# Patient Record
Sex: FEMALE | Race: OTHER | HISPANIC OR LATINO | URBAN - METROPOLITAN AREA
[De-identification: names, ages, dates, MRNs, and addresses within clinical notes are randomized per-mention and may not be internally consistent; named-entity substitution may affect disease eponyms.]

---

## 2017-01-08 ENCOUNTER — EMERGENCY (EMERGENCY)
Facility: HOSPITAL | Age: 37
LOS: 1 days | Discharge: HOME | End: 2017-01-08
Attending: EMERGENCY MEDICINE
Payer: COMMERCIAL

## 2017-01-08 VITALS
RESPIRATION RATE: 20 BRPM | SYSTOLIC BLOOD PRESSURE: 109 MMHG | DIASTOLIC BLOOD PRESSURE: 76 MMHG | HEART RATE: 70 BPM | OXYGEN SATURATION: 100 % | TEMPERATURE: 98 F

## 2017-01-08 VITALS — WEIGHT: 160.06 LBS

## 2017-01-08 LAB
APPEARANCE UR: CLEAR — SIGNIFICANT CHANGE UP
BACTERIA # UR AUTO: ABNORMAL
BILIRUB UR-MCNC: NEGATIVE — SIGNIFICANT CHANGE UP
COLOR SPEC: YELLOW — SIGNIFICANT CHANGE UP
DIFF PNL FLD: ABNORMAL
EPI CELLS # UR: SIGNIFICANT CHANGE UP
GLUCOSE UR QL: NEGATIVE MG/DL — SIGNIFICANT CHANGE UP
HCG UR QL: NEGATIVE — SIGNIFICANT CHANGE UP
KETONES UR-MCNC: NEGATIVE — SIGNIFICANT CHANGE UP
LEUKOCYTE ESTERASE UR-ACNC: NEGATIVE — SIGNIFICANT CHANGE UP
NITRITE UR-MCNC: NEGATIVE — SIGNIFICANT CHANGE UP
PH UR: 6.5 — SIGNIFICANT CHANGE UP (ref 4.8–8)
PROT UR-MCNC: NEGATIVE MG/DL — SIGNIFICANT CHANGE UP
RBC CASTS # UR COMP ASSIST: SIGNIFICANT CHANGE UP /HPF (ref 0–4)
SP GR SPEC: 1 — LOW (ref 1.01–1.02)
UROBILINOGEN FLD QL: NEGATIVE MG/DL — SIGNIFICANT CHANGE UP
WBC UR QL: SIGNIFICANT CHANGE UP

## 2017-01-08 PROCEDURE — 81001 URINALYSIS AUTO W/SCOPE: CPT

## 2017-01-08 PROCEDURE — 81025 URINE PREGNANCY TEST: CPT

## 2017-01-08 PROCEDURE — 99283 EMERGENCY DEPT VISIT LOW MDM: CPT

## 2017-01-08 RX ORDER — DOCUSATE SODIUM 100 MG
1 CAPSULE ORAL
Qty: 60 | Refills: 0 | OUTPATIENT
Start: 2017-01-08 | End: 2017-02-07

## 2017-01-08 RX ORDER — SENNA PLUS 8.6 MG/1
1 TABLET ORAL
Qty: 5 | Refills: 0 | OUTPATIENT
Start: 2017-01-08 | End: 2017-01-13

## 2017-01-08 NOTE — ED STATDOCS - MEDICAL DECISION MAKING DETAILS
No need for emergent abdominal imaging today. Discussed bowel regiment, check UA, prescribe colace, and refer to f/u with GI

## 2017-01-08 NOTE — ED STATDOCS - DETAILS:
I, Rosemary Mckeon, performed the initial face to face bedside interview with this patient regarding history of present illness, review of symptoms and relevant past medical, social and family history.  I completed an independent physical examination.  I was the initial provider who evaluated this patient. I have signed out the follow up of any pending tests (i.e. labs, radiological studies) to the ACP.  I have communicated the patient’s plan of care and disposition with the ACP.  The history, relevant review of systems, past medical and surgical history, medical decision making, and physical examination was documented by the scribe in my presence and I attest to the accuracy of the documentation.

## 2017-01-08 NOTE — ED STATDOCS - OBJECTIVE STATEMENT
35 y/o F presents with intermittent abdominal pressure x 3 months but worse x 3 days. Pt reports abnormal bowel movements including constipation and decreased stool production. She follows GI and PCP and had endoscopy for similar symptoms but was told endoscopy was nml. Pt states abdominal pain feels like gas pain. She denies nausea and vomiting. Pt denies tobacco use. No further complaints at this time. 35 y/o F presents with intermittent abdominal "gas-like" pressure x 3 months but worse x 3 days. Pt reports abnormal bowel movements including constipation and decreased stool production. She follows GI and PCP and had endoscopy for similar symptoms but was told endoscopy was nml. Pt states abdominal pain feels like gas pain. She denies nausea and vomiting. Pt denies tobacco use. No further complaints at this time.

## 2017-01-08 NOTE — ED STATDOCS - NS ED MD SCRIBE ATTENDING SCRIBE SECTIONS
REVIEW OF SYSTEMS/DISPOSITION/HIV/VITAL SIGNS( Pullset)/HISTORY OF PRESENT ILLNESS/PHYSICAL EXAM/PAST MEDICAL/SURGICAL/SOCIAL HISTORY

## 2017-05-25 ENCOUNTER — EMERGENCY (EMERGENCY)
Facility: HOSPITAL | Age: 37
LOS: 1 days | Discharge: HOME | End: 2017-05-25
Attending: EMERGENCY MEDICINE
Payer: COMMERCIAL

## 2017-05-25 VITALS
WEIGHT: 160.06 LBS | SYSTOLIC BLOOD PRESSURE: 117 MMHG | HEART RATE: 84 BPM | TEMPERATURE: 98 F | RESPIRATION RATE: 18 BRPM | DIASTOLIC BLOOD PRESSURE: 78 MMHG | OXYGEN SATURATION: 100 % | HEIGHT: 64 IN
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
## 2017-05-25 DIAGNOSIS — M79.652 PAIN IN LEFT THIGH: ICD-10-CM

## 2017-05-25 DIAGNOSIS — M54.32 SCIATICA, LEFT SIDE: ICD-10-CM

## 2017-05-25 PROCEDURE — 99283 EMERGENCY DEPT VISIT LOW MDM: CPT

## 2017-05-25 RX ORDER — IBUPROFEN 200 MG
600 TABLET ORAL ONCE
Qty: 0 | Refills: 0 | Status: COMPLETED | OUTPATIENT
Start: 2017-05-25 | End: 2017-05-25

## 2017-05-25 RX ADMIN — Medication 600 MILLIGRAM(S): at 21:47

## 2017-05-25 NOTE — ED STATDOCS - MEDICAL DECISION MAKING DETAILS
Given pt's history, occupation, and symptoms, discussed possibility of sciatica. Will advise treatment with NSAIDs and f/u with PMD for outpatient management.

## 2017-05-25 NOTE — ED ADULT TRIAGE NOTE - CHIEF COMPLAINT QUOTE
pt c/o left posterior upper thigh pain radiating down to great toe causing numbness. denies injury. resting/sitting makes pain worse.

## 2017-05-25 NOTE — ED STATDOCS - OBJECTIVE STATEMENT
37 y/o female presents to ED c/o non radiating pain/pressure to left posterior thigh x 2 days. She states that her pain is exacerbated with sitting, and pt sits frequently at work ( x 6 hours/day). She also notes numbness to left anterior shin and left great toe. Pt sought ED evaluation this evening because she was concerned about DVT. Denies lower back pain, calf pain or swelling. Denies recent long car rides/flights. No further complaints at this time.

## 2017-05-25 NOTE — ED STATDOCS - NS ED MD SCRIBE ATTENDING SCRIBE SECTIONS
PAST MEDICAL/SURGICAL/SOCIAL HISTORY/VITAL SIGNS( Pullset)/REVIEW OF SYSTEMS/HISTORY OF PRESENT ILLNESS/HIV/DISPOSITION/PHYSICAL EXAM

## 2019-08-05 ENCOUNTER — ASOB RESULT (OUTPATIENT)
Age: 39
End: 2019-08-05

## 2019-08-05 ENCOUNTER — APPOINTMENT (OUTPATIENT)
Dept: ANTEPARTUM | Facility: CLINIC | Age: 39
End: 2019-08-05
Payer: COMMERCIAL

## 2019-08-05 PROCEDURE — 76817 TRANSVAGINAL US OBSTETRIC: CPT

## 2019-08-16 ENCOUNTER — EMERGENCY (EMERGENCY)
Facility: HOSPITAL | Age: 39
LOS: 1 days | Discharge: HOME | End: 2019-08-16
Attending: EMERGENCY MEDICINE
Payer: COMMERCIAL

## 2019-08-16 VITALS
HEIGHT: 64 IN | TEMPERATURE: 99 F | WEIGHT: 164.91 LBS | DIASTOLIC BLOOD PRESSURE: 77 MMHG | SYSTOLIC BLOOD PRESSURE: 161 MMHG | OXYGEN SATURATION: 100 % | HEART RATE: 63 BPM | RESPIRATION RATE: 18 BRPM

## 2019-08-16 PROCEDURE — 99284 EMERGENCY DEPT VISIT MOD MDM: CPT

## 2019-08-16 PROCEDURE — 76817 TRANSVAGINAL US OBSTETRIC: CPT | Mod: 26

## 2019-08-16 NOTE — ED ADULT TRIAGE NOTE - CADM TRG TX PRIOR TO ARRIVAL
Pre-Op call completed. Medications list reviewed and updated as needed.  Instructed patient to hold all medications am of surgery.      Patient instructed to arrive for surgery at 0700 on 7/30/19 at [facility].    Pre-op instructions reviewed, verbalized understanding.  Questions answered.  Call back number of [phone] given in case other concerns or questions arise.    Patient instructed to bring in current list of medications (name of medication, dose and frequency of daily use) day of surgery.     Patient instructed to call Pre Admission Testing Nurses if patient develops any fever/ chills or symptoms of general illness prior to surgery.       Pre-op Teaching Completed:    OR - Procedure, OR - Time and time of arrival, Location of Registration, NPO, Medications to take Day of Surgery, Skin Prep, Equipment to bring day of surgery and Discharge Policy: Ride/Responsibile Adult    
none

## 2019-08-16 NOTE — ED PROVIDER NOTE - OBJECTIVE STATEMENT
38 yo female  Lmp may 25th   OBGYN Deepali curiel   last sono was 2 weeks ago with sac but no baby or FHR noted. was given option of waiting or having D=C scheduled and elected to wait.   today lower abdominal cramping and brownish yellow discharge slight  no cp.sob. n,v,d/   no allergies or pmhx

## 2019-08-16 NOTE — ED PROVIDER NOTE - PROGRESS NOTE DETAILS
DR KIM saw patient in ER   states that she is to be discharged and will fu in the office within the next couple days   as per OBGYN not ectopic rupture vs are stable and pt advised on importance of fu

## 2019-08-16 NOTE — ED PROVIDER NOTE - ATTENDING CONTRIBUTION TO CARE
I performed a history and physical exam of the patient and discussed their management with the advanced care provider. I reviewed the advanced care provider's note and agree with the documented findings and plan of care. My medical decision making and objective findings are found above.     40yo female  at 12 weeks gestation presenting with vaginal bleeding and cramping. patient had US with ob/gyn which did not demonstrate IUP. At that time patient offered D&C vs expectant management. Today, patient states that she had worsening bleeding and pain which prompted her to come to ED. PE exam remarkable for minimal TTP over suprapubic region. Will obtain labs, TVUS, d/w ob/gyn, and reassess. Likely threatened ab vs ectopic will continue to monitor. Padmini Eduardo DO.

## 2019-08-16 NOTE — CONSULT NOTE ADULT - SUBJECTIVE AND OBJECTIVE BOX
VERONICA REYES is a 40yo  with LMP 19 with previously seen intrauterine sac in the office. She was previously offered a D&C vs. expectant management and opted for expectant management.  She starting having lower abdominal cramping this morning and having a small amount of bleeding. She denies any heavy bleeding. She reports that she has had some intermittent, sharp LLQ  abdominal pain. + constipation,  but patient states that she chronically has issues with chronic constipation.     ROS:  Gen: no fatigue  CV: no chest pain  Resp: no SOB, wheezing  Neuro: no vision change, headache  GI: + lower abdominal pain, diarrhea, + constipation  : no dysuria, increased frequency, urge  Gyn: + vaginal bleeding, abnormal discharge  ID: no fevers, chills  Int: no rash  MSK: no weakness      ObHx:   -    -    GynHx:     - periods every 28 days  - no h/o abnormal Paps  - denies any STIs  - sexually active, trying to get pregnant     SocHx:      No Known Allergies        Meds:  Home Medications:    Health Maintenance:       Height (cm): 162.56 (19 @ 18:22)  Weight (kg): 74.8 (19 @ 18:22)  BMI (kg/m2): 28.3 (19 @ 18:22)  BSA (m2): 1.8 (19 @ 18:22)  T(C): 37 (19 @ 18:22), Max: 37 (19 @ 18:22)  HR: 63 (19 @ 18:22) (63 - 63)  BP: 161/77 (19 @ 18:22) (161/77 - 161/77)  RR: 18 (19 @ 18:22) (18 - 18)  SpO2: 100% (19 @ 18:22) (100% - 100%)    Physical Exam:  Gen: alert oriented no acute distress  Abd: soft, + LLQ tenderness, no surgical incisions,+ BS  Gyn:    external genitalia normal in appearance no lesions    on speculum exam no abnormal lesions visualized, scant blood in the vagina, normal appear parous cervix    on bimanual exam small anteverted uterus no adnexal masses palpated, + left adnexal tenderness.  No CMT.         < from: US Transvaginal, OB (19 @ 21:42) >  terus: 11.1 x 5.8 x 7.7 cm. Subchorionic hematoma measuring 2.1 x 0.9 x   1.0 cm.    Gestational Sac Size (mean): Irregular,2.5 cm, compatible with estimated   gestational age of 7 weeks 2 days. No fetal pole identified. No yolk sac   identified.    Right ovary: 4.8 x 2.5 x 2.1 cm. 2.0 cm corpus luteum.    Left ovary: 2.5 x 1.8 x 1.3 cm.Prominent tubular structure with mobile   debris within the left adnexa.    Fluid: None.    IMPRESSION:    Irregular 25 mm saclike structure within the uterus without fetal pole or   yolk sac. Prominent tubular structure with mobile debris noted within the   left adnexa, suspicious forhematosalpinx. Constellation of findings are   worrisome for left adnexal ectopic ruptured into the left fallopian tube   with possible pseudosac in the uterus. Failed pregnancy with incidental   pyosalpinx is possible but considered less likely. Comparison with   patient's outside most recent imaging would be helpful. Findings were   discussed with Dr. Dupont at 2019.      < end of copied text >      Case discussed with Dr. Phoenix Pending beta hcG and CBC VERONICA REYES is a 38yo  with LMP 19 with previously seen intrauterine sac in the office. She was previously offered a D&C vs. expectant management and opted for expectant management.  She starting having lower abdominal cramping this morning and having a small amount of bleeding. She denies any heavy bleeding. She reports that she has had some intermittent, sharp LLQ  abdominal pain. + constipation,  but patient states that she chronically has issues with chronic constipation.     ROS:  Gen: no fatigue  CV: no chest pain  Resp: no SOB, wheezing  Neuro: no vision change, headache  GI: + lower abdominal pain, diarrhea, + constipation  : no dysuria, increased frequency, urge  Gyn: + vaginal bleeding, abnormal discharge  ID: no fevers, chills  Int: no rash  MSK: no weakness      ObHx:   -    -    GynHx:     - periods every 28 days  - no h/o abnormal Paps  - denies any STIs  - sexually active, trying to get pregnant     SocHx:      No Known Allergies        Meds:  Home Medications:    Health Maintenance:       Height (cm): 162.56 (19 @ 18:22)  Weight (kg): 74.8 (19 @ 18:22)  BMI (kg/m2): 28.3 (19 @ 18:22)  BSA (m2): 1.8 (19 @ 18:22)  T(C): 37 (19 @ 18:22), Max: 37 (19 @ 18:22)  HR: 63 (19 @ 18:22) (63 - 63)  BP: 161/77 (19 @ 18:22) (161/77 - 161/77)  RR: 18 (19 @ 18:22) (18 - 18)  SpO2: 100% (19 @ 18:22) (100% - 100%)    Physical Exam:  Gen: alert oriented no acute distress  Abd: soft, + LLQ tenderness, no surgical incisions,+ BS  Gyn:    external genitalia normal in appearance no lesions    on speculum exam no abnormal lesions visualized, scant blood in the vagina, normal appear parous cervix    on bimanual exam small anteverted uterus no adnexal masses palpated, + left adnexal tenderness.  No CMT.         < from: US Transvaginal, OB (19 @ 21:42) >  terus: 11.1 x 5.8 x 7.7 cm. Subchorionic hematoma measuring 2.1 x 0.9 x   1.0 cm.    Gestational Sac Size (mean): Irregular,2.5 cm, compatible with estimated   gestational age of 7 weeks 2 days. No fetal pole identified. No yolk sac   identified.    Right ovary: 4.8 x 2.5 x 2.1 cm. 2.0 cm corpus luteum.    Left ovary: 2.5 x 1.8 x 1.3 cm.Prominent tubular structure with mobile   debris within the left adnexa.    Fluid: None.    IMPRESSION:    Irregular 25 mm saclike structure within the uterus without fetal pole or   yolk sac. Prominent tubular structure with mobile debris noted within the   left adnexa, suspicious forhematosalpinx. Constellation of findings are   worrisome for left adnexal ectopic ruptured into the left fallopian tube   with possible pseudosac in the uterus. Failed pregnancy with incidental   pyosalpinx is possible but considered less likely. Comparison with   patient's outside most recent imaging would be helpful. Findings were   discussed with Dr. Dupont at 2019.      < end of copied text >

## 2019-08-16 NOTE — ED STATDOCS - ATTENDING CONTRIBUTION TO CARE
I performed a history and physical exam of the patient and discussed their management with the advanced care provider. I reviewed the advanced care provider's note and agree with the documented findings and plan of care. My medical decision making and objective findings are found above.     38yo female  at 12 weeks gestation presenting with vaginal bleeding and cramping. patient had US with ob/gyn which did not demonstrate IUP. At that time patient offered D&C vs expectant management. Today, patient states that she had worsening bleeding and pain which prompted her to come to ED. PE exam remarkable for minimal TTP over suprapubic region. Will obtain labs, TVUS, d/w ob/gyn, and reassess. Likely threatened ab vs ectopic will continue to monitor. Padmini Eduardo DO

## 2019-08-17 LAB
ALBUMIN SERPL ELPH-MCNC: 4.5 G/DL — SIGNIFICANT CHANGE UP (ref 3.3–5.2)
ALP SERPL-CCNC: 52 U/L — SIGNIFICANT CHANGE UP (ref 40–120)
ALT FLD-CCNC: 13 U/L — SIGNIFICANT CHANGE UP
ANION GAP SERPL CALC-SCNC: 11 MMOL/L — SIGNIFICANT CHANGE UP (ref 5–17)
APPEARANCE UR: CLEAR — SIGNIFICANT CHANGE UP
APTT BLD: 29.4 SEC — SIGNIFICANT CHANGE UP (ref 27.5–36.3)
AST SERPL-CCNC: 22 U/L — SIGNIFICANT CHANGE UP
BASOPHILS # BLD AUTO: 0.02 K/UL — SIGNIFICANT CHANGE UP (ref 0–0.2)
BASOPHILS NFR BLD AUTO: 0.4 % — SIGNIFICANT CHANGE UP (ref 0–2)
BILIRUB SERPL-MCNC: 0.3 MG/DL — LOW (ref 0.4–2)
BILIRUB UR-MCNC: NEGATIVE — SIGNIFICANT CHANGE UP
BLD GP AB SCN SERPL QL: SIGNIFICANT CHANGE UP
BUN SERPL-MCNC: 7 MG/DL — LOW (ref 8–20)
CALCIUM SERPL-MCNC: 9.9 MG/DL — SIGNIFICANT CHANGE UP (ref 8.6–10.2)
CHLORIDE SERPL-SCNC: 101 MMOL/L — SIGNIFICANT CHANGE UP (ref 98–107)
CO2 SERPL-SCNC: 24 MMOL/L — SIGNIFICANT CHANGE UP (ref 22–29)
COLOR SPEC: YELLOW — SIGNIFICANT CHANGE UP
CREAT SERPL-MCNC: 0.45 MG/DL — LOW (ref 0.5–1.3)
DIFF PNL FLD: NEGATIVE — SIGNIFICANT CHANGE UP
EOSINOPHIL # BLD AUTO: 0.07 K/UL — SIGNIFICANT CHANGE UP (ref 0–0.5)
EOSINOPHIL NFR BLD AUTO: 1.3 % — SIGNIFICANT CHANGE UP (ref 0–6)
GLUCOSE SERPL-MCNC: 96 MG/DL — SIGNIFICANT CHANGE UP (ref 70–115)
GLUCOSE UR QL: NEGATIVE MG/DL — SIGNIFICANT CHANGE UP
HCG SERPL-ACNC: HIGH MIU/ML
HCG UR QL: POSITIVE
HCT VFR BLD CALC: 35.9 % — SIGNIFICANT CHANGE UP (ref 34.5–45)
HGB BLD-MCNC: 11.9 G/DL — SIGNIFICANT CHANGE UP (ref 11.5–15.5)
IMM GRANULOCYTES NFR BLD AUTO: 0.4 % — SIGNIFICANT CHANGE UP (ref 0–1.5)
INR BLD: 1.07 RATIO — SIGNIFICANT CHANGE UP (ref 0.88–1.16)
KETONES UR-MCNC: NEGATIVE — SIGNIFICANT CHANGE UP
LEUKOCYTE ESTERASE UR-ACNC: NEGATIVE — SIGNIFICANT CHANGE UP
LYMPHOCYTES # BLD AUTO: 1.73 K/UL — SIGNIFICANT CHANGE UP (ref 1–3.3)
LYMPHOCYTES # BLD AUTO: 32.2 % — SIGNIFICANT CHANGE UP (ref 13–44)
MCHC RBC-ENTMCNC: 30.7 PG — SIGNIFICANT CHANGE UP (ref 27–34)
MCHC RBC-ENTMCNC: 33.1 GM/DL — SIGNIFICANT CHANGE UP (ref 32–36)
MCV RBC AUTO: 92.5 FL — SIGNIFICANT CHANGE UP (ref 80–100)
MONOCYTES # BLD AUTO: 0.53 K/UL — SIGNIFICANT CHANGE UP (ref 0–0.9)
MONOCYTES NFR BLD AUTO: 9.9 % — SIGNIFICANT CHANGE UP (ref 2–14)
NEUTROPHILS # BLD AUTO: 3 K/UL — SIGNIFICANT CHANGE UP (ref 1.8–7.4)
NEUTROPHILS NFR BLD AUTO: 55.8 % — SIGNIFICANT CHANGE UP (ref 43–77)
NITRITE UR-MCNC: NEGATIVE — SIGNIFICANT CHANGE UP
PH UR: 6.5 — SIGNIFICANT CHANGE UP (ref 5–8)
PLATELET # BLD AUTO: 278 K/UL — SIGNIFICANT CHANGE UP (ref 150–400)
POTASSIUM SERPL-MCNC: 3.8 MMOL/L — SIGNIFICANT CHANGE UP (ref 3.5–5.3)
POTASSIUM SERPL-SCNC: 3.8 MMOL/L — SIGNIFICANT CHANGE UP (ref 3.5–5.3)
PROT SERPL-MCNC: 7.6 G/DL — SIGNIFICANT CHANGE UP (ref 6.6–8.7)
PROT UR-MCNC: NEGATIVE MG/DL — SIGNIFICANT CHANGE UP
PROTHROM AB SERPL-ACNC: 12.3 SEC — SIGNIFICANT CHANGE UP (ref 10–12.9)
RBC # BLD: 3.88 M/UL — SIGNIFICANT CHANGE UP (ref 3.8–5.2)
RBC # FLD: 12.9 % — SIGNIFICANT CHANGE UP (ref 10.3–14.5)
SODIUM SERPL-SCNC: 136 MMOL/L — SIGNIFICANT CHANGE UP (ref 135–145)
SP GR SPEC: 1 — LOW (ref 1.01–1.02)
UROBILINOGEN FLD QL: NEGATIVE MG/DL — SIGNIFICANT CHANGE UP
WBC # BLD: 5.37 K/UL — SIGNIFICANT CHANGE UP (ref 3.8–10.5)
WBC # FLD AUTO: 5.37 K/UL — SIGNIFICANT CHANGE UP (ref 3.8–10.5)

## 2019-08-17 PROCEDURE — 99284 EMERGENCY DEPT VISIT MOD MDM: CPT | Mod: 25

## 2019-08-17 PROCEDURE — 86850 RBC ANTIBODY SCREEN: CPT

## 2019-08-17 PROCEDURE — T1013: CPT

## 2019-08-17 PROCEDURE — 87186 SC STD MICRODIL/AGAR DIL: CPT

## 2019-08-17 PROCEDURE — 81025 URINE PREGNANCY TEST: CPT

## 2019-08-17 PROCEDURE — 76817 TRANSVAGINAL US OBSTETRIC: CPT

## 2019-08-17 PROCEDURE — 86900 BLOOD TYPING SEROLOGIC ABO: CPT

## 2019-08-17 PROCEDURE — 87086 URINE CULTURE/COLONY COUNT: CPT

## 2019-08-17 PROCEDURE — 85730 THROMBOPLASTIN TIME PARTIAL: CPT

## 2019-08-17 PROCEDURE — 80053 COMPREHEN METABOLIC PANEL: CPT

## 2019-08-17 PROCEDURE — 76801 OB US < 14 WKS SINGLE FETUS: CPT

## 2019-08-17 PROCEDURE — 84702 CHORIONIC GONADOTROPIN TEST: CPT

## 2019-08-17 PROCEDURE — 36415 COLL VENOUS BLD VENIPUNCTURE: CPT

## 2019-08-17 PROCEDURE — 86901 BLOOD TYPING SEROLOGIC RH(D): CPT

## 2019-08-17 PROCEDURE — 85027 COMPLETE CBC AUTOMATED: CPT

## 2019-08-17 PROCEDURE — 81003 URINALYSIS AUTO W/O SCOPE: CPT

## 2019-08-17 PROCEDURE — 85610 PROTHROMBIN TIME: CPT

## 2019-08-17 NOTE — CHART NOTE - NSCHARTNOTEFT_GEN_A_CORE
Pt presented with spotting  She was diagnosed with a missed AB on 8/6/19  She desired conservative mgmt, opting for spontaneous resolution instead of a d&c  Her ultrasound findings at the time showed a gestational sac with a fetal pole and yolk sac, absent FH\  this scan was with MFM  at the time she had no bleeding and did not accept the diagnosis  she requested a repeat sono at another radiology facility, which she did not perform  pt has opted to have an outpatient d&c performed on Monday  She will be an add-on, rather than wait an unknown amount of time this weekend, prior to OR availability  pt understands all call parameters, increased bleeding, pain, cramping  all was explained with an , although she is fluent in English.  The later 10 minutes of the discussion was in English.

## 2020-03-06 ENCOUNTER — APPOINTMENT (OUTPATIENT)
Dept: ANTEPARTUM | Facility: CLINIC | Age: 40
End: 2020-03-06

## 2020-03-13 ENCOUNTER — ASOB RESULT (OUTPATIENT)
Age: 40
End: 2020-03-13

## 2020-03-13 ENCOUNTER — APPOINTMENT (OUTPATIENT)
Dept: ANTEPARTUM | Facility: CLINIC | Age: 40
End: 2020-03-13
Payer: COMMERCIAL

## 2020-03-13 PROCEDURE — 76801 OB US < 14 WKS SINGLE FETUS: CPT

## 2020-04-07 ENCOUNTER — APPOINTMENT (OUTPATIENT)
Dept: ANTEPARTUM | Facility: CLINIC | Age: 40
End: 2020-04-07

## 2020-04-07 ENCOUNTER — ASOB RESULT (OUTPATIENT)
Age: 40
End: 2020-04-07

## 2020-04-07 ENCOUNTER — APPOINTMENT (OUTPATIENT)
Dept: MATERNAL FETAL MEDICINE | Facility: CLINIC | Age: 40
End: 2020-04-07
Payer: COMMERCIAL

## 2020-04-07 PROCEDURE — 99441: CPT

## 2020-04-23 ENCOUNTER — APPOINTMENT (OUTPATIENT)
Dept: MATERNAL FETAL MEDICINE | Facility: CLINIC | Age: 40
End: 2020-04-23

## 2020-06-03 ENCOUNTER — ASOB RESULT (OUTPATIENT)
Age: 40
End: 2020-06-03

## 2020-06-03 ENCOUNTER — APPOINTMENT (OUTPATIENT)
Dept: ANTEPARTUM | Facility: CLINIC | Age: 40
End: 2020-06-03
Payer: COMMERCIAL

## 2020-06-03 PROCEDURE — 76811 OB US DETAILED SNGL FETUS: CPT

## 2023-06-05 NOTE — ED ADULT TRIAGE NOTE - MEANS OF ARRIVAL
Medicare Annual Wellness Visit    Slime Sainz is here for Medicare AWV and Fatigue    Assessment & Plan   Medicare annual wellness visit, subsequent    Recommendations for Preventive Services Due: see orders and patient instructions/AVS.  Recommended screening schedule for the next 5-10 years is provided to the patient in written form: see Patient Instructions/AVS.     No follow-ups on file. Subjective       Patient's complete Health Risk Assessment and screening values have been reviewed and are found in Flowsheets. The following problems were reviewed today and where indicated follow up appointments were made and/or referrals ordered. Positive Risk Factor Screenings with Interventions:    Fall Risk:  Do you feel unsteady or are you worried about falling? : (!) yes  2 or more falls in past year?: no  Fall with injury in past year?: no     Interventions:    See AVS for additional education material            General HRA Questions:  Select all that apply: (!) New or Increased Fatigue    Fatigue Interventions:  See AVS for additional education material       Weight and Activity:  Physical Activity: Inactive    Days of Exercise per Week: 0 days    Minutes of Exercise per Session: 0 min     On average, how many days per week do you engage in moderate to strenuous exercise (like a brisk walk)?: 0 days  Have you lost any weight without trying in the past 3 months?: No  Body mass index is 38.69 kg/m².  (!) Abnormal    Inactivity Interventions:  See AVS for additional education material  Obesity Interventions:  See A/P for plan and any pertinent orders               Advanced Directives:  Do you have a Living Will?: (!) No    Intervention:                           Objective   Vitals:    06/05/23 1305   BP: 96/60   Site: Left Upper Arm   Position: Sitting   Cuff Size: Large Adult   Pulse: 87   Resp: 15   Temp: 97.7 °F (36.5 °C)   TempSrc: Temporal   SpO2: 96%   Weight: 247 lb (112 kg)   Height: 5' 7\" (1.702 ambulatory